# Patient Record
Sex: MALE | Race: WHITE
[De-identification: names, ages, dates, MRNs, and addresses within clinical notes are randomized per-mention and may not be internally consistent; named-entity substitution may affect disease eponyms.]

---

## 2017-04-25 ENCOUNTER — HOSPITAL ENCOUNTER (EMERGENCY)
Dept: HOSPITAL 45 - C.EDB | Age: 27
LOS: 1 days | Discharge: HOME | End: 2017-04-26
Payer: COMMERCIAL

## 2017-04-25 VITALS
HEIGHT: 67.01 IN | BODY MASS INDEX: 32.63 KG/M2 | BODY MASS INDEX: 32.63 KG/M2 | WEIGHT: 207.9 LBS | HEIGHT: 67.01 IN | WEIGHT: 207.9 LBS

## 2017-04-25 VITALS — TEMPERATURE: 98.06 F | OXYGEN SATURATION: 96 %

## 2017-04-25 DIAGNOSIS — F17.210: ICD-10-CM

## 2017-04-25 DIAGNOSIS — M54.5: Primary | ICD-10-CM

## 2017-04-26 VITALS — DIASTOLIC BLOOD PRESSURE: 88 MMHG | HEART RATE: 77 BPM | SYSTOLIC BLOOD PRESSURE: 132 MMHG

## 2017-04-26 NOTE — EMERGENCY ROOM VISIT NOTE
History


First contact with patient:  23:19


Chief Complaint:  BACK PAIN


Stated Complaint:  BACK PAIN, TROUBLE WALKING





History of Present Illness


The patient is a 26 year old male who presents to the Emergency Room with 

complaints of back pain worsening over the past day.  The patient states he was 

lifting a log at work when he fell a sharp pain in his back.  His pain does 

worsen with twisting and certain range of motion.  He does not have numbness or 

paresthesias.  No fever or chills.  The patient does not have a history of 

ongoing back pain and rates his current discomfort an 8/10.  He has not had 

relief of symptoms with over-the-counter analgesics.  He has been able to use 

the bathroom as normal.





Review of Systems


More than 10 systems were reviewed and otherwise negative with the exception of 

history of present illness.





Past Medical/Surgical History


No chronic medical disease





Family History


No pertinent family history





Social History


Smoking Status:  Current Every Day Smoker


Housing Status:  lives with family


Occupation Status:  employed





Current/Historical Medications


Scheduled


Cyclobenzaprine Hcl (Flexeril), 10 MG PO TID


Oxycodone Immediate Rel Tab (Roxicodone Ir), 1 TAB PO Q6


Prednisone (Prednisone), 50 MG PO DAILY





Allergies


Coded Allergies:  


     No Known Allergies (Unverified , 4/25/17)





Physical Exam


Vital Signs











  Date Time  Temp Pulse Resp B/P Pulse Ox O2 Delivery O2 Flow Rate FiO2


 


4/26/17 00:50  77 18 132/88  Room Air  


 


4/25/17 23:17 36.7 76 18 128/67 96 Room Air  








Pain Rating (0-10):  6.0





Physical Exam


VITALS: Vitals are noted on the nurse's note and reviewed by myself.  Vital 

signs stable.


GENERAL: Well-developed, well-nourished, white male, who is moderately 

comfortable on examination. 


NECK: Supple without nuchal rigidity.  No lymphadenopathy.  No thyromegaly.  

Cervical spine is nontender.  


HEART: Regular rate and rhythm without murmurs gallops or rubs.


LUNGS: Clear to auscultation bilaterally without wheezes, rales or rhonchi.  No 

retractions or accessory muscle use.


ABDOMEN: Positive normal bowel sounds x 4.  Soft, nontender, without masses or 

organomegaly.  No guarding or rebound tenderness.


MUSCULOSKELETAL: No muscle atrophy, erythema, or edema noted.  Full range of 

motion without joint tenderness in all extremities.  Positive lower lumbar and 

right-sided SI joint tenderness on palpation.  No saddle paresthesias.  

Positive right straight leg raise.


NEURO: Patient was alert and oriented to person place and time. CN II through 

XII grossly intact.  Deep tendon reflexes 2+ throughout. No focal neurological 

deficits


SKIN: The skin was without rashes, erythema, edema, or bruising.  Capillary 

reflex less than 2 seconds.





Medical Decision & Procedures


Medications Administered











 Medications


  (Trade)  Dose


 Ordered  Sig/Sharan


 Route  Start Time


 Stop Time Status Last Admin


Dose Admin


 


 Oxycodone/


 Acetaminophen


  (Percocet


 5-325mg Tab)  2 tab  NOW  STAT


 PO  4/25/17 23:28


 4/25/17 23:29 DC 4/25/17 23:36


2 TAB


 


 Ibuprofen


  (Motrin Tab)  600 mg  NOW  STAT


 PO  4/25/17 23:28


 4/25/17 23:29 DC 4/25/17 23:35


600 MG


 


 Oxycodone HCl


  (Roxicodone


 Immediate Rel 5MG


 Home Pack)  1 homepack  UD  ONCE


 PO  4/26/17 00:45


 4/26/17 00:46 DC 4/26/17 00:49


1 HOMEPACK











ED Course


Physical exam and history were performed. Nursing notes and EMR were reviewed.





Patient appears to have right-sided low back pain that began earlier today 

while lifting at work.  The patient does have palpable tenderness but no 

neurologic deficit.  X-rays were performed and read by myself as showing no 

acute process with radiology report pending.  The patient was given oral 

Percocet and ibuprofen here in the department.  He did have improvement of his 

symptoms after medication here in the department.  Overall the patient does 

appear stable for discharge home.  I do not suspect cauda equina or abscess.  

His symptoms are likely musculoskeletal.  The patient will be given a 

continuation course of OxyIR, Flexeril, and prednisone.  He may use over-the-

counter analgesics with this as well.  The patient is to follow with Workmen's 

Compensation for further care.  He was otherwise invited back to the ER anytime.





The chart was completed utilizing Dragon Speech Voice Recognition Software. 

Grammatical errors, random word insertions, pronoun errors, and incomplete 

sentences are an occasional consequence of this system due to software 

limitations, ambient noise, and hardware issues. Any formal questions or 

concerns about the content, text, or information contained within the body of 

this dictation should be directly addressed to the provider for clarification.





Medical Decision


Differential diagnosis:


Etiologies such as musculoskeletal, disc herniation, fracture, aortic disease, 

metastatic disease, cord compression, discitis, infection, renal colic, 

gastrointestinal, acute exacerbation of chronic back pain, sciatica, cauda 

equina, as well as others were entertained.





Impression





 Primary Impression:  


 Low back pain





Departure Information


Dispostion


Home / Self-Care





Condition


GOOD





Prescriptions





Prednisone (Prednisone) 50 Mg Tab


50 MG PO DAILY for 4 Days, #4 TAB


   Prov: Av Barrett PA-C         4/26/17 


Cyclobenzaprine Hcl (FLEXERIL) 10 Mg Tab


10 MG PO TID for 7 Days, #21 TAB


   Prov: Av Barrett PA-C         4/26/17 


Oxycodone Immediate Rel Tab (ROXICODONE IR) 5 Mg Tab


1 TAB PO Q6, #12 TAB


   Prov: Av Barrett PA-C         4/26/17





Forms


HOME CARE DOCUMENTATION FORM,                                                 

               IMPORTANT VISIT INFORMATION





Patient Instructions


My [facility]





Additional Instructions





You were seen and evaluated today on an emergency basis only.  This is not a 

substitute for, or an effort to provide, complete comprehensive medical care.  

It is not possible to recognize and treat all injuries or illnesses in a single 

emergency department visit. For this reason it is recommended that you followup 

with your Workmen's Compensation provider for ongoing care and evaluation.





For baseline pain relief you may alternate ibuprofen and acetaminophen every 4 

hours for pain control. Take 600 mg ibuprofen (Advil) and then 4 hours later 

take 1000 mg acetaminophen (Tylenol). Do not take more than 3000 mg 

acetaminophen in a single day.  





Oxycodone (OxyIR) 5mg: Take ONE pill every SIX hours for breakthrough pain.  

Avoid alcohol, operating machinery or dangerous equipment, working on ladders 

or roofs, DRIVING, or situations where being under the influence may be 

dangerous.  It is recommended to use an over-the-counter stool softener such as 

Colace, 100mg twice daily while taking this medication to avoid constipation. 





Flexeril 1 tablet up to 3 times a day as needed for muscle spasms.  No driving, 

working, or alcohol use with Flexeril.





Take prednisone as prescribed





You are welcome to return to the emergency department anytime with new, 

worsening, or concerning symptoms.

## 2017-04-26 NOTE — DIAGNOSTIC IMAGING REPORT
LUMBAR SPINE 5 VIEWS



CLINICAL HISTORY: Low back pain.



FINDINGS: 5 views of the lumbar spine are  obtained. No prior studies are

available for comparison at the time of dictation.  The skeletal structures are

well mineralized. There is no radiographic evidence of fracture or malalignment.

Vertebral body height and alignment are maintained. There is straightening of

the lumbar lordosis. The transverse and spinous processes are intact. There is

no evidence of spondylolysis. The intervertebral disc spaces are

well-maintained. The visualized bony pelvis appears intact. There is a

nonobstructed abdominal bowel gas pattern.



IMPRESSION: Unremarkable radiographic evaluation of the lumbosacral spine.







Electronically signed by:  Js Tucker M.D.

4/26/2017 7:35 AM



Dictated Date/Time:  4/26/2017 7:35 AM

## 2018-03-15 ENCOUNTER — HOSPITAL ENCOUNTER (OUTPATIENT)
Dept: HOSPITAL 45 - C.ULTR | Age: 28
Discharge: HOME | End: 2018-03-15
Attending: FAMILY MEDICINE
Payer: COMMERCIAL

## 2018-03-15 DIAGNOSIS — R10.9: Primary | ICD-10-CM

## 2018-03-15 NOTE — DIAGNOSTIC IMAGING REPORT
ABDOMINAL ULTRASOUND, RIGHT UPPER QUADRANT



HISTORY:     Abdominal pain.



COMPARISON:  None.



FINDINGS: Hepatic echogenicity is increased. This suggests fatty infiltration

with sparing within the gallbladder fossa. The gallbladder is normal. There are

no gallstones. There are no hepatic lesions. The pancreas is largely obscured by

overlying bowel gas. There is no biliary ductal dilatation. The common bile duct

measures 3 mm in caliber. There is no right hydronephrosis.



IMPRESSION: 





1. Fatty infiltration of the liver.



2. No gallstones or biliary ductal dilatation.



3. Largely obscured pancreas.







Electronically signed by:  Romario Tuttle M.D.

3/15/2018 10:25 AM



Dictated Date/Time:  3/15/2018 10:24 AM